# Patient Record
Sex: FEMALE | Race: BLACK OR AFRICAN AMERICAN
[De-identification: names, ages, dates, MRNs, and addresses within clinical notes are randomized per-mention and may not be internally consistent; named-entity substitution may affect disease eponyms.]

---

## 2017-02-25 ENCOUNTER — HOSPITAL ENCOUNTER (EMERGENCY)
Dept: HOSPITAL 62 - ER | Age: 57
Discharge: HOME | End: 2017-02-25
Payer: COMMERCIAL

## 2017-02-25 VITALS — DIASTOLIC BLOOD PRESSURE: 86 MMHG | SYSTOLIC BLOOD PRESSURE: 170 MMHG

## 2017-02-25 DIAGNOSIS — E11.9: ICD-10-CM

## 2017-02-25 DIAGNOSIS — I10: ICD-10-CM

## 2017-02-25 DIAGNOSIS — M25.569: ICD-10-CM

## 2017-02-25 DIAGNOSIS — M54.2: Primary | ICD-10-CM
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
## 2017-02-25 DIAGNOSIS — M54.9: ICD-10-CM

## 2017-02-25 PROCEDURE — 72050 X-RAY EXAM NECK SPINE 4/5VWS: CPT

## 2017-02-25 PROCEDURE — 99284 EMERGENCY DEPT VISIT MOD MDM: CPT
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
## 2017-05-30 ENCOUNTER — HOSPITAL ENCOUNTER (EMERGENCY)
Dept: HOSPITAL 62 - ER | Age: 57
Discharge: HOME | End: 2017-05-30
Payer: COMMERCIAL

## 2017-05-30 VITALS — SYSTOLIC BLOOD PRESSURE: 140 MMHG | DIASTOLIC BLOOD PRESSURE: 80 MMHG

## 2017-05-30 DIAGNOSIS — S16.1XXA: Primary | ICD-10-CM

## 2017-05-30 DIAGNOSIS — I10: ICD-10-CM
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
## 2017-05-30 DIAGNOSIS — Z90.710: ICD-10-CM

## 2017-05-30 DIAGNOSIS — S93.401A: ICD-10-CM

## 2017-05-30 DIAGNOSIS — S63.501A: ICD-10-CM

## 2017-05-30 DIAGNOSIS — E11.9: ICD-10-CM

## 2017-05-30 PROCEDURE — L3984 UPPER EXT FX ORTHOSIS WRIST: HCPCS

## 2017-05-30 PROCEDURE — 73610 X-RAY EXAM OF ANKLE: CPT

## 2017-05-30 PROCEDURE — 99283 EMERGENCY DEPT VISIT LOW MDM: CPT

## 2017-05-30 PROCEDURE — 73110 X-RAY EXAM OF WRIST: CPT
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
## 2017-05-30 NOTE — RADIOLOGY REPORT (SQ)
EXAM DESCRIPTION:  ANKLE RIGHT COMPLETE



COMPLETED DATE/TIME:  5/30/2017 2:40 am



REASON FOR STUDY:  injury



COMPARISON:  None.



NUMBER OF VIEWS:  Three views.



TECHNIQUE:  AP, lateral, and oblique radiographic images acquired of the right ankle.



LIMITATIONS:  None.



FINDINGS:  MINERALIZATION: Normal.

BONES: No acute fracture or dislocation.  No worrisome bone lesions.  Small plantar faucial enthesoph
yte.

JOINTS: No effusions.

SOFT TISSUES: Mild diffuse ankle swelling.

OTHER: No other significant finding.



IMPRESSION:  Mild swelling.



TECHNICAL DOCUMENTATION:  JOB ID:  [id number]

 2011 [employer]- All Rights Reserved

## 2017-07-04 ENCOUNTER — HOSPITAL ENCOUNTER (EMERGENCY)
Dept: HOSPITAL 62 - ER | Age: 57
Discharge: HOME | End: 2017-07-04
Payer: MEDICARE

## 2017-07-04 VITALS — SYSTOLIC BLOOD PRESSURE: 176 MMHG | DIASTOLIC BLOOD PRESSURE: 88 MMHG

## 2017-07-04 DIAGNOSIS — E11.9: ICD-10-CM
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
## 2017-07-04 DIAGNOSIS — N39.0: Primary | ICD-10-CM

## 2017-07-04 DIAGNOSIS — I10: ICD-10-CM

## 2017-07-04 DIAGNOSIS — G89.29: ICD-10-CM

## 2017-07-04 DIAGNOSIS — R31.9: ICD-10-CM

## 2017-07-04 DIAGNOSIS — M54.5: ICD-10-CM

## 2017-07-04 LAB
APPEARANCE UR: (no result)
BILIRUB UR QL STRIP: NEGATIVE
GLUCOSE UR STRIP-MCNC: >=500 MG/DL
KETONES UR STRIP-MCNC: (no result) MG/DL
NITRITE UR QL STRIP: NEGATIVE
PH UR STRIP: 7 [PH] (ref 5–9)
PROT UR STRIP-MCNC: 100 MG/DL
SP GR UR STRIP: 1.03
UROBILINOGEN UR-MCNC: NEGATIVE MG/DL (ref ?–2)

## 2017-07-04 PROCEDURE — 99283 EMERGENCY DEPT VISIT LOW MDM: CPT

## 2017-07-04 PROCEDURE — 87186 SC STD MICRODIL/AGAR DIL: CPT

## 2017-07-04 PROCEDURE — 81001 URINALYSIS AUTO W/SCOPE: CPT

## 2017-07-04 PROCEDURE — 87088 URINE BACTERIA CULTURE: CPT

## 2017-07-04 PROCEDURE — 87086 URINE CULTURE/COLONY COUNT: CPT

## 2017-07-04 PROCEDURE — 96372 THER/PROPH/DIAG INJ SC/IM: CPT
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
## 2017-12-26 ENCOUNTER — HOSPITAL ENCOUNTER (EMERGENCY)
Dept: HOSPITAL 62 - ER | Age: 57
Discharge: HOME | End: 2017-12-26
Payer: MEDICARE

## 2017-12-26 VITALS — DIASTOLIC BLOOD PRESSURE: 79 MMHG | SYSTOLIC BLOOD PRESSURE: 133 MMHG

## 2017-12-26 DIAGNOSIS — M79.605: ICD-10-CM

## 2017-12-26 DIAGNOSIS — E11.9: ICD-10-CM

## 2017-12-26 DIAGNOSIS — M25.552: ICD-10-CM
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
## 2017-12-26 DIAGNOSIS — S46.912A: Primary | ICD-10-CM

## 2017-12-26 DIAGNOSIS — M79.672: ICD-10-CM

## 2017-12-26 DIAGNOSIS — E86.0: ICD-10-CM

## 2017-12-26 DIAGNOSIS — I10: ICD-10-CM

## 2017-12-26 DIAGNOSIS — E78.5: ICD-10-CM

## 2017-12-26 DIAGNOSIS — M54.9: ICD-10-CM

## 2017-12-26 LAB
ALBUMIN SERPL-MCNC: 4.3 G/DL (ref 3.5–5)
ALP SERPL-CCNC: 106 U/L (ref 38–126)
ALT SERPL-CCNC: 24 U/L (ref 9–52)
ANION GAP SERPL CALC-SCNC: 11 MMOL/L (ref 5–19)
AST SERPL-CCNC: 13 U/L (ref 14–36)
BASOPHILS # BLD AUTO: 0 10^3/UL (ref 0–0.2)
BASOPHILS NFR BLD AUTO: 0.7 % (ref 0–2)
BILIRUB DIRECT SERPL-MCNC: 0.3 MG/DL (ref 0–0.4)
BILIRUB SERPL-MCNC: 0.5 MG/DL (ref 0.2–1.3)
BUN SERPL-MCNC: 23 MG/DL (ref 7–20)
CALCIUM: 10 MG/DL (ref 8.4–10.2)
CHLORIDE SERPL-SCNC: 102 MMOL/L (ref 98–107)
CK SERPL-CCNC: 50 U/L (ref 30–135)
CO2 SERPL-SCNC: 27 MMOL/L (ref 22–30)
CREAT SERPL-MCNC: 0.8 MG/DL (ref 0.52–1.25)
EOSINOPHIL # BLD AUTO: 0.1 10^3/UL (ref 0–0.6)
EOSINOPHIL NFR BLD AUTO: 1.5 % (ref 0–6)
ERYTHROCYTE [DISTWIDTH] IN BLOOD BY AUTOMATED COUNT: 13.4 % (ref 11.5–14)
GLUCOSE SERPL-MCNC: 213 MG/DL (ref 75–110)
HCT VFR BLD CALC: 37.4 % (ref 36–47)
HGB BLD-MCNC: 12.8 G/DL (ref 12–15.5)
HGB HCT DIFFERENCE: 1
LYMPHOCYTES # BLD AUTO: 2.1 10^3/UL (ref 0.5–4.7)
LYMPHOCYTES NFR BLD AUTO: 36.3 % (ref 13–45)
MCH RBC QN AUTO: 27.4 PG (ref 27–33.4)
MCHC RBC AUTO-ENTMCNC: 34.3 G/DL (ref 32–36)
MCV RBC AUTO: 80 FL (ref 80–97)
MONOCYTES # BLD AUTO: 0.2 10^3/UL (ref 0.1–1.4)
MONOCYTES NFR BLD AUTO: 3.6 % (ref 3–13)
NEUTROPHILS # BLD AUTO: 3.4 10^3/UL (ref 1.7–8.2)
NEUTS SEG NFR BLD AUTO: 57.9 % (ref 42–78)
POTASSIUM SERPL-SCNC: 4.9 MMOL/L (ref 3.6–5)
PROT SERPL-MCNC: 7.6 G/DL (ref 6.3–8.2)
RBC # BLD AUTO: 4.68 10^6/UL (ref 3.72–5.28)
SODIUM SERPL-SCNC: 139.8 MMOL/L (ref 137–145)
URATE SERPL-MCNC: 3.6 MG/DL (ref 2.5–7.5)
WBC # BLD AUTO: 5.8 10^3/UL (ref 4–10.5)

## 2017-12-26 PROCEDURE — 99283 EMERGENCY DEPT VISIT LOW MDM: CPT

## 2017-12-26 PROCEDURE — 96361 HYDRATE IV INFUSION ADD-ON: CPT

## 2017-12-26 PROCEDURE — 36415 COLL VENOUS BLD VENIPUNCTURE: CPT

## 2017-12-26 PROCEDURE — 80053 COMPREHEN METABOLIC PANEL: CPT

## 2017-12-26 PROCEDURE — 85025 COMPLETE CBC W/AUTO DIFF WBC: CPT

## 2017-12-26 PROCEDURE — 96360 HYDRATION IV INFUSION INIT: CPT

## 2017-12-26 PROCEDURE — 84550 ASSAY OF BLOOD/URIC ACID: CPT

## 2017-12-26 PROCEDURE — 82550 ASSAY OF CK (CPK): CPT

## 2017-12-26 NOTE — ER DOCUMENT REPORT
ED General





- General


Mode of Arrival: Ambulatory


Information source: Patient


TRAVEL OUTSIDE OF THE [place] IN LAST 30 DAYS: No





- HPI


Onset: Other





<KATHY COLLINS - Last Filed: 12/26/17 14:04>





<STEFFANIE WESTON - Last Filed: 12/26/17 15:57>





- General


Chief Complaint: Leg Pain


Stated Complaint: LEFT LEG PAIN


Time Seen by Provider: 12/26/17 12:56


Notes: 


Patient is a 57 year old female with a history of hypertension, diabetes, and 

hyperlipidemia presents to the emergency department complaining of left foot 

pain onset 3 weeks ago worsening 1 week ago. Patient states that the pain 

extends from her left hip and into her left leg. Patient also complained of 

left sided back pain. 


 (KATHY COLLINS)





- Related Data


Allergies/Adverse Reactions: 


 





No Known Allergies Allergy (Verified 12/26/17 12:22)


 











Past Medical History





- General


Information source: Patient





- Social History


Smoking Status: Never Smoker


Frequency of alcohol use: None


Drug Abuse: None


Family History: Reviewed & Not Pertinent


Patient has suicidal ideation: No


Patient has homicidal ideation: No





- Past Medical History


Cardiac Medical History: Reports: Hx Hypertension


Endocrine Medical History: Reports: Hx Diabetes Mellitus Type 2


Past Surgical History: Reports: Hx Appendectomy, Hx Hysterectomy, Hx 

Tonsillectomy





- Immunizations


Hx Diphtheria, Pertussis, Tetanus Vaccination: Yes





<KATHY COLLINS - Last Filed: 12/26/17 14:04>





Review of Systems





- Review of Systems


Constitutional: No symptoms reported


EENT: No symptoms reported


Cardiovascular: No symptoms reported


Respiratory: No symptoms reported


Gastrointestinal: No symptoms reported


Genitourinary: No symptoms reported


Female Genitourinary: No symptoms reported


Musculoskeletal: See HPI, Back pain


Skin: No symptoms reported


Hematologic/Lymphatic: No symptoms reported


Neurological/Psychological: No symptoms reported


-: Yes All other systems reviewed and negative





<KATHY COLLINS - Last Filed: 12/26/17 14:04>





Physical Exam





- General


General appearance: Appears well, Alert, Other - Patient appears drowsy at 

bedside.


In distress: None





- HEENT


Head: Normocephalic, Atraumatic


Eyes: Normal


Conjunctiva: Normal


Pupils: PERRL





- Respiratory


Respiratory status: No respiratory distress





- Cardiovascular


Rhythm: Regular


Heart sounds: Normal auscultation





- Abdominal


Inspection: Normal





- Back


Back: Tender - tender to the left lateral scapula





- Extremities


General upper extremity: Normal ROM


General lower extremity: Tender


Foot: Tender - tender to left metatarsal head with no swelling





- Neurological


Neuro grossly intact: Yes


Cognition: Normal


Orientation: AAOx4


[place] Coma Scale Eye Opening: Spontaneous


[place] Coma Scale Verbal: Oriented


[place] Coma Scale Motor: Obeys Commands


[place] Coma Scale Total: 15


Speech: Normal





- Psychological


Associated symptoms: Normal affect, Normal mood





- Skin


Skin Temperature: Warm


Skin Moisture: Dry


Skin Color: Normal





<KATHY COLLINS - Last Filed: 12/26/17 14:04>





<STEFFANIE WESTON - Last Filed: 12/26/17 15:57>





- Vital signs


Vitals: 


 











Temp Pulse Resp BP Pulse Ox


 


 98.1 F   84   16   220/105 H  96 


 


 12/26/17 12:29  12/26/17 12:29  12/26/17 12:29  12/26/17 12:29  12/26/17 12:29














- Extremities


Notes: 


Patient screams when palpating anywhere on left leg, particularly the fasica 

jose. (KATHY COLLINS)





Course





- Laboratory


Result Diagrams: 


 12/26/17 14:05





 12/26/17 14:05





<STEFFANIE WESTON - Last Filed: 12/26/17 15:57>





- Vital Signs


Vital signs: 


 











Temp Pulse Resp BP Pulse Ox


 


 98.6 F   77   65 H  110/68   97 


 


 12/26/17 13:14  12/26/17 13:14  12/26/17 15:31  12/26/17 15:31  12/26/17 13:14














- Laboratory


Laboratory results interpreted by me: 


 











  12/26/17





  14:05


 


BUN  23 H


 


Glucose  213 H


 


AST  13 L














Discharge





<KATHY COLLINS - Last Filed: 12/26/17 14:04>





<STEFFANIE WESTON - Last Filed: 12/26/17 15:57>





- Discharge


Clinical Impression: 


 Musculoskeletal pain of left lower extremity, Dehydration





Muscle strain of scapular region


Qualifiers:


 Encounter type: initial encounter Laterality: left Qualified Code(s): S46.912A 

- Strain of unspecified muscle, fascia and tendon at shoulder and upper arm 

level, left arm, initial encounter





Condition: Stable


Disposition: HOME, SELF-CARE


Additional Instructions: 


Muscle Strain





     You have PROBABLY strained  muscles in your left lateral scapular region 

and your left leg and foot. The seriousness of a strain varies. Some strains 

heal within days, others cause problems for months.


     X-rays cannot show a muscle strain.  X-rays are taken only if symptoms 

suggest that a fracture could be present.


     The usual treatment of a muscle strain is rest and moist heat.  The muscle 

can be used again once pain subsides.  Severe strains require a special 

exercise and stretching program to prevent permanent stiffness and disability.  

Your doctor will advise you if this will be necessary.


     Call the doctor immediately if pain or swelling becomes severe, or if 

numbness or discoloration develop.





Dehydration





     Dehydration can result from vomiting or diarrhea, fever, or decreased 

intake of fluids.  If severe, hospitalization and intravenous fluids may be 

required.  Most cases are treated at home with fluids by mouth.


     For the next 24 hours, drink lots of clear fluids.  Try to get three 

liters (3 quarts) of fluid per day.


    


////////////////////////////////////////////////////////////////////////////////

////////////////////////////////////////////////////////////////////////////////

/////////////////








The pain in your left scapular region and left lower extremity appears to be 

due to muscular strain.


Your blood pressure dropped quite a bit when you received medication for pain 

and your elevated blood pressure.


      This occurred because you are somewhat dehydrated.


You should drink plenty of fluids today and tonight.


You should rest the muscles in the leg and in your left upper back.  Limit 

walking and do any lifting you need to using the right arm.


You are prescribed Tramadol for the pain if needed.


Be sure to check your blood sugars regularly.


Follow-up with your primary care provider if you are not improving.





RETURN TO THE EMERGENCY ROOM IF ANY NEW OR WORSENING SYMPTOMS.











Prescriptions: 


Tramadol HCl 50 mg PO ASDIR PRN #20 tablet


 PRN Reason: For Pain


Referrals: 


OTIILA ART MD [Primary Care Provider] - Follow up as needed


Scribe Attestation: 





12/26/17 14:20


I personally performed the services described in the documentation, reviewed 

and edited the documentation which was dictated to the scribe in my presence, 

and it accurately records my words and actions. (STEFFANIE WESTON)





Scribe Documentation





- Scribe


Written by Scribe:: Alton Tobar, 12/26/2017 14:05


acting as scribe for :: Segun





<KATHY COLLINS - Last Filed: 12/26/17 14:04>

## 2017-12-26 NOTE — ER DOCUMENT REPORT
ED Medical Screen (RME)





- General


Chief Complaint: Leg Pain


Stated Complaint: LEFT LEG PAIN


Time Seen by Provider: 12/26/17 12:56


Mode of Arrival: Ambulatory


Information source: Patient


Notes: 





57-year-old female presents with complaints of left foot pain.  Patient denies 

any fevers or chills.  Patient's pains been going on for 2-3 weeks admits to 

high blood pressure and that she takes pills twice a day








I have greeted and performed a rapid initial assessment of this patient.  A 

comprehensive ED assessment and evaluation of the patient, analysis of test 

results and completion of the medical decision making process will be conducted 

by additional ED providers.





PHYSICAL EXAMINATION:





GENERAL: Well-appearing, well-nourished and in no acute distress.





HEAD: Atraumatic, normocephalic.





EYES: Pupils equal round extraocular movements intact,  conjunctiva are normal.





ENT: Nares patent





NECK: Normal range of motion





LUNGS: No respiratory distress





Musculoskeletal: tender left foot 








NEUROLOGICAL:  Normal speech, normal gait. 





PSYCH: Normal mood, normal affect.





SKIN: Warm, Dry, normal turgor, no rashes or lesions noted.


TRAVEL OUTSIDE OF THE [place] IN LAST 30 DAYS: No





- Related Data


Allergies/Adverse Reactions: 


 





No Known Allergies Allergy (Verified 12/26/17 12:22)


 











Past Medical History





- Social History


Frequency of alcohol use: None


Drug Abuse: None





- Past Medical History


Cardiac Medical History: Reports: Hx Hypertension


Endocrine Medical History: Reports: Hx Diabetes Mellitus Type 2


Renal/ Medical History: Denies: Hx Peritoneal Dialysis


Past Surgical History: Reports: Hx Appendectomy, Hx Hysterectomy, Hx 

Tonsillectomy





- Immunizations


Hx Diphtheria, Pertussis, Tetanus Vaccination: Yes





Physical Exam





- Vital signs


Vitals: 





 











Temp Pulse Resp BP Pulse Ox


 


 98.1 F   84   16   220/105 H  96 


 


 12/26/17 12:29  12/26/17 12:29  12/26/17 12:29  12/26/17 12:29  12/26/17 12:29














Course





- Vital Signs


Vital signs: 





 











Temp Pulse Resp BP Pulse Ox


 


 98.1 F   84   18   220/105 H  96 


 


 12/26/17 12:29  12/26/17 12:29  12/26/17 12:54  12/26/17 12:29  12/26/17 12:29

## 2018-01-07 ENCOUNTER — HOSPITAL ENCOUNTER (EMERGENCY)
Dept: HOSPITAL 62 - ER | Age: 58
Discharge: HOME | End: 2018-01-07
Payer: MEDICARE

## 2018-01-07 VITALS — SYSTOLIC BLOOD PRESSURE: 168 MMHG | DIASTOLIC BLOOD PRESSURE: 114 MMHG

## 2018-01-07 DIAGNOSIS — Z90.710: ICD-10-CM

## 2018-01-07 DIAGNOSIS — I10: ICD-10-CM

## 2018-01-07 DIAGNOSIS — M79.672: ICD-10-CM

## 2018-01-07 DIAGNOSIS — R10.11: Primary | ICD-10-CM

## 2018-01-07 DIAGNOSIS — Z88.6: ICD-10-CM

## 2018-01-07 DIAGNOSIS — E11.9: ICD-10-CM

## 2018-01-07 LAB
BASOPHILS # BLD AUTO: 0 10^3/UL (ref 0–0.2)
BASOPHILS NFR BLD AUTO: 0.8 % (ref 0–2)
EOSINOPHIL # BLD AUTO: 0.1 10^3/UL (ref 0–0.6)
EOSINOPHIL NFR BLD AUTO: 1.3 % (ref 0–6)
ERYTHROCYTE [DISTWIDTH] IN BLOOD BY AUTOMATED COUNT: 13.1 % (ref 11.5–14)
HCT VFR BLD CALC: 37.6 % (ref 36–47)
HGB BLD-MCNC: 12.9 G/DL (ref 12–15.5)
LYMPHOCYTES # BLD AUTO: 1.9 10^3/UL (ref 0.5–4.7)
LYMPHOCYTES NFR BLD AUTO: 31 % (ref 13–45)
MCH RBC QN AUTO: 27.9 PG (ref 27–33.4)
MCHC RBC AUTO-ENTMCNC: 34.4 G/DL (ref 32–36)
MCV RBC AUTO: 81 FL (ref 80–97)
MONOCYTES # BLD AUTO: 0.2 10^3/UL (ref 0.1–1.4)
MONOCYTES NFR BLD AUTO: 3.2 % (ref 3–13)
NEUTROPHILS # BLD AUTO: 3.9 10^3/UL (ref 1.7–8.2)
NEUTS SEG NFR BLD AUTO: 63.7 % (ref 42–78)
PLATELET # BLD: 203 10^3/UL (ref 150–450)
RBC # BLD AUTO: 4.64 10^6/UL (ref 3.72–5.28)
TOTAL CELLS COUNTED % (AUTO): 100 %
WBC # BLD AUTO: 6.2 10^3/UL (ref 4–10.5)

## 2018-01-07 PROCEDURE — 85025 COMPLETE CBC W/AUTO DIFF WBC: CPT

## 2018-01-07 PROCEDURE — 83690 ASSAY OF LIPASE: CPT

## 2018-01-07 PROCEDURE — 99283 EMERGENCY DEPT VISIT LOW MDM: CPT

## 2018-01-07 PROCEDURE — 80053 COMPREHEN METABOLIC PANEL: CPT

## 2018-01-07 PROCEDURE — 36415 COLL VENOUS BLD VENIPUNCTURE: CPT

## 2018-01-08 LAB
ADD MANUAL DIFF: NO
ALBUMIN SERPL-MCNC: 4.2 G/DL (ref 3.5–5)
ALP SERPL-CCNC: 96 U/L (ref 38–126)
ALT SERPL-CCNC: 27 U/L (ref 9–52)
ANION GAP SERPL CALC-SCNC: 10 MMOL/L (ref 5–19)
AST SERPL-CCNC: 13 U/L (ref 14–36)
BILIRUB DIRECT SERPL-MCNC: 0.2 MG/DL (ref 0–0.4)
BILIRUB SERPL-MCNC: 0.6 MG/DL (ref 0.2–1.3)
BUN SERPL-MCNC: 14 MG/DL (ref 7–20)
CALCIUM: 9.5 MG/DL (ref 8.4–10.2)
CHLORIDE SERPL-SCNC: 102 MMOL/L (ref 98–107)
CO2 SERPL-SCNC: 30 MMOL/L (ref 22–30)
GLUCOSE SERPL-MCNC: 196 MG/DL (ref 75–110)
LIPASE SERPL-CCNC: 339.2 U/L (ref 23–300)
POTASSIUM SERPL-SCNC: 3.9 MMOL/L (ref 3.6–5)
PROT SERPL-MCNC: 7.4 G/DL (ref 6.3–8.2)
SODIUM SERPL-SCNC: 141.7 MMOL/L (ref 137–145)

## 2018-01-08 NOTE — ER DOCUMENT REPORT
ED General





- General


Chief Complaint: Leg Pain


Stated Complaint: LEFT LEG PAIN


Time Seen by Provider: 01/07/18 09:22


Mode of Arrival: Ambulatory


Information source: Patient


Notes: 





57-year-old female presents with 2 separate complaints.  Patient notes that her 

left foot pain has been ongoing now for a few days as well as right upper 

quadrant abdominal pain over the past 4 days.  It is noted that the patient 

actually has neuropathy and has had foot pain for years but she does not admit 

to this initially, patient's abdominal pain has been on and off for a week


TRAVEL OUTSIDE OF THE [place] IN LAST 30 DAYS: No





- HPI


Onset: Other


Onset/Duration: Intermittent


Quality of pain: Burning


Severity: Mild


Pain Level: 1


Associated symptoms: Other


Exacerbated by: Denies


Relieved by: Denies


Similar symptoms previously: Yes


Recently seen / treated by doctor: Yes





- Related Data


Allergies/Adverse Reactions: 


 





ibuprofen [From Motrin] Allergy (Verified 01/07/18 08:47)


 











Past Medical History





- Social History


Smoking Status: Never Smoker


Cigarette use (# per day): No


Chew tobacco use (# tins/day): No


Smoking Education Provided: No


Family History: Reviewed & Not Pertinent





- Past Medical History


Cardiac Medical History: Reports: Hx Hypertension


Endocrine Medical History: Reports: Hx Diabetes Mellitus Type 2


Renal/ Medical History: Denies: Hx Peritoneal Dialysis


Past Surgical History: Reports: Hx Appendectomy, Hx Hysterectomy, Hx 

Tonsillectomy





- Immunizations


Hx Diphtheria, Pertussis, Tetanus Vaccination: Yes





Review of Systems





- Review of Systems


Notes: 





REVIEW OF SYSTEMS:


CONSTITUTIONAL :  Denies fever,  chills, or sweats.  Denies recent illness.


EENT:   Denies eye, ear, throat, or mouth pain or symptoms.  Denies nasal or 

sinus congestion or discharge.  Denies throat, tongue, or mouth swelling or 

difficulty swallowing.


CARDIOVASCULAR:  Denies chest pain.  Denies palpitations or racing or irregular 

heart beat.  Denies ankle edema.


RESPIRATORY:  Denies cough, cold, or chest congestion.  Denies shortness of 

breath, difficulty breathing, or wheezing.


GASTROINTESTINAL: Admits to right upper quadrant abdominal pain


GENITOURINARY:  Denies difficulty urinating, painful urination, burning, 

frequency, blood in urine, or discharge.


FEMALE  GENITOURINARY:  Denies vaginal bleeding, heavy or abnormal periods, 

irregular periods.  Denies vaginal discharge or odor. 


MUSCULOSKELETAL: Admits left foot pain


SKIN:   Denies rash, lesions or sores.


HEMATOLOGIC :   Denies easy bruising or bleeding.


LYMPHATIC:  Denies swollen, enlarged glands.


NEUROLOGICAL:  Denies confusion or altered mental status.  Denies passing out 

or loss of consciousness.  Denies dizziness or lightheadedness.  Denies 

headache.  Denies weakness or paralysis or loss of use of either side.  Denies 

problems with gait or speech.  Denies sensory loss, numbness, or tingling.  

Denies seizures.


PSYCHIATRIC:  Denies anxiety or stress.  Denies depression, suicidal ideation, 

or homicidal ideation.





ALL OTHER SYSTEMS REVIEWED AND NEGATIVE.











PHYSICAL EXAMINATION:





GENERAL: Well-appearing, well-nourished and in no acute distress.





HEAD: Atraumatic, normocephalic.





EYES: Pupils equal round and reactive to light, extraocular movements intact, 

conjunctiva are normal.





ENT: Nares patent, oropharynx clear without exudates.  Moist mucous membranes.





NECK: Normal range of motion, supple without lymphadenopathy





LUNGS: Breath sounds clear to auscultation bilaterally and equal.  No wheezes 

rales or rhonchi.





HEART: Regular rate and rhythm without murmurs





ABDOMEN: Soft, nontender, nondistended abdomen.  No guarding, no rebound.  No 

masses appreciated.  No tenderness upon palpation





Female : deferred





Musculoskeletal: Normal range of motion, no pitting or edema.  No cyanosis.





NEUROLOGICAL: Cranial nerves grossly intact.  Normal speech, normal gait.  

Normal sensory, motor exams





PSYCH: Normal mood, normal affect.





SKIN: Warm, Dry, normal turgor, no rashes or lesions noted.  Patient states 

there is pain upon palpation of the foot

















Dictation was performed using Dragon voice recognition software





Physical Exam





- Vital signs


Vitals: 





 











Temp Pulse Resp BP Pulse Ox


 


 98.5 F   80   16   168/114 H  93 


 


 01/07/18 08:53  01/07/18 08:53  01/07/18 08:53  01/07/18 08:53  01/07/18 08:53














Course





- Re-evaluation


Re-evalutation: 





01/08/18 08:29


Pulses are intact patient is able to move her digits sensation motor functions 

appropriate but patient states that is extremely painful upon touch.  I 

extremely low suspicion for any life-threatening issues, on her lab work 

everything was benign this was consistent with previous lab work from a few 

days ago.  Patient consistently asked for pain medication was given such.  It 

appears she has been treated extensively for this neuropathic pain which 

initially denied








I will have the patient follow-up with her primary care physician, her pain 

specialist, and her neurologist for further evaluation care








After performing a Medical Screening Examination, I estimate there is LOW risk 

for ACUTE APPENDICITIS, BOWEL OBSTRUCTION, ACUTE CHOLECYSTITIS, PERFORATED 

DIVERTICULITIS, INCARCERATED HERNIA, PANCREATITIS, PELVIC INFLAMMATORY DISEASE, 

PERFORATED ULCER, ECTOPIC PREGNANCY, or TUBO-OVARIAN ABSCESS, thus I consider 

the discharge disposition reasonable. Also, there is no evidence or peritonitis

, sepsis, or toxicity. I have reevaluated this patient multiple times and no 

significant life threatening changes are noted. The patient and I have 

discussed the diagnosis and risks, and we agree with discharging home with 

close follow-up with the understanding that symptoms and presentations can 

change. We also discussed returning to the Emergency Department immediately if 

new or worsening symptoms occur. We have discussed the symptoms which are most 

concerning (e.g., bloody stool, fever, changing or worsening pain, vomiting) 

that necessitate immediate return.





- Vital Signs


Vital signs: 





 











Temp Pulse Resp BP Pulse Ox


 


 98.5 F   80   16   168/114 H  93 


 


 01/07/18 08:53  01/07/18 08:53  01/07/18 08:53  01/07/18 08:53  01/07/18 08:53














- Laboratory


Result Diagrams: 


 01/07/18 10:43





 01/07/18 10:43





Discharge





- Discharge


Clinical Impression: 


 Abdominal pain, right upper quadrant, Left foot pain





Condition: Stable


Disposition: HOME, SELF-CARE


Instructions:  Abdominal Pain ([facility])


Referrals: 


OTILIA ART MD [Primary Care Provider] - Follow up tomorrow

## 2018-01-24 ENCOUNTER — HOSPITAL ENCOUNTER (OUTPATIENT)
Dept: HOSPITAL 62 - WI | Age: 58
End: 2018-01-24
Attending: INTERNAL MEDICINE
Payer: MEDICARE

## 2018-01-24 DIAGNOSIS — Z12.31: Primary | ICD-10-CM

## 2018-01-24 PROCEDURE — 77067 SCR MAMMO BI INCL CAD: CPT

## 2018-01-24 NOTE — WOMENS IMAGING REPORT
EXAM DESCRIPTION:  BILAT SCREENING MAMMO W/CAD



COMPLETED DATE/TIME:  1/24/2018 3:33 pm



REASON FOR STUDY:  ROUTINE SCREENING; Z12.31 Z12.31  ENCNTR SCREEN MAMMOGRAM FOR MALIGNANT NEOPLASM O
F SRINIVAS



COMPARISON:  7/10/2013 and 11/13/2010.



TECHNIQUE:  Standard craniocaudal and mediolateral oblique views of each breast recorded using digita
l acquisition.



LIMITATIONS:  None.



FINDINGS:  No masses, calcifications or architectural distortion. No areas of suspicion.

Read with the assistance of CAD.

.[facility] - R2 Cenova Version 1.3

.[facility] - R2 Cenova Version 1.3

.[facility] - R2 Cenova Version 2.4

.[facility] - R2 Cenova Version 2.4

.[facility] - R2  Version 9.2



IMPRESSION:  NORMAL MAMMOGRAM.  BIRADS 1.



BREAST DENSITY:  b. There are scattered areas of fibroglandular density.



BIRAD:  1 NEGATIVE



RECOMMENDATION:  ROUTINE SCREENING



COMMENT:  The patient has been notified of the results by letter per MQSA requirements. Additional no
tification policies are in place for contacting patient with suspicious or incomplete findings.

Quality ID #225: The American College of Radiology recommends an annual screening mammogram for women
 aged 40 years or over. This facility utilizes a reminder system to ensure that all patients receive 
reminder letters, and/or direct phone calls for appointments. This includes reminders for routine scr
eening mammograms, diagnostic mammograms, or other Breast Imaging Interventions when appropriate.  Th
is patient will be placed in the appropriate reminder system.

The American College of Radiology (ACR) has developed recommendations for screening MRI of the breast
s in certain patient populations, to be used in conjunction with mammography.  Breast MRI surveillanc
e may be appropriate for women with more than 20% lifetime risk of developing breast cancer  as deter
mined by genetic testing, significant family history of the disease, or history of mantle radiation f
or Hodgkins Disease.  ACR Practice Guidelines 2008.



TECHNICAL DOCUMENTATION:  FINDING NUMBER: (1)

ASSESSMENT: (1)

JOB ID:  [id number]

 2011 [employer]- All Rights Reserved

## 2020-08-31 ENCOUNTER — HOSPITAL ENCOUNTER (EMERGENCY)
Dept: HOSPITAL 62 - ER | Age: 60
Discharge: HOME | End: 2020-08-31
Payer: MEDICARE

## 2020-08-31 VITALS — SYSTOLIC BLOOD PRESSURE: 191 MMHG | DIASTOLIC BLOOD PRESSURE: 99 MMHG

## 2020-08-31 DIAGNOSIS — Z88.8: ICD-10-CM

## 2020-08-31 DIAGNOSIS — M54.2: ICD-10-CM

## 2020-08-31 DIAGNOSIS — B37.3: Primary | ICD-10-CM

## 2020-08-31 DIAGNOSIS — H92.02: ICD-10-CM

## 2020-08-31 DIAGNOSIS — I10: ICD-10-CM

## 2020-08-31 DIAGNOSIS — E11.65: ICD-10-CM

## 2020-08-31 DIAGNOSIS — G89.29: ICD-10-CM

## 2020-08-31 DIAGNOSIS — H60.502: ICD-10-CM

## 2020-08-31 LAB
ADD MANUAL DIFF: NO
ALBUMIN SERPL-MCNC: 4 G/DL (ref 3.5–5)
ALP SERPL-CCNC: 162 U/L (ref 38–126)
ANION GAP SERPL CALC-SCNC: 9 MMOL/L (ref 5–19)
APPEARANCE UR: (no result)
APTT PPP: YELLOW S
AST SERPL-CCNC: 20 U/L (ref 14–36)
BASE EXCESS BLDV CALC-SCNC: 0.2 MMOL/L
BASOPHILS # BLD AUTO: 0 10^3/UL (ref 0–0.2)
BASOPHILS NFR BLD AUTO: 0.5 % (ref 0–2)
BILIRUB DIRECT SERPL-MCNC: 0.2 MG/DL (ref 0–0.4)
BILIRUB SERPL-MCNC: 0.8 MG/DL (ref 0.2–1.3)
BILIRUB UR QL STRIP: NEGATIVE
BUN SERPL-MCNC: 14 MG/DL (ref 7–20)
CALCIUM: 9 MG/DL (ref 8.4–10.2)
CHLORIDE SERPL-SCNC: 97 MMOL/L (ref 98–107)
CO2 SERPL-SCNC: 27 MMOL/L (ref 22–30)
EOSINOPHIL # BLD AUTO: 0.1 10^3/UL (ref 0–0.6)
EOSINOPHIL NFR BLD AUTO: 1.4 % (ref 0–6)
ERYTHROCYTE [DISTWIDTH] IN BLOOD BY AUTOMATED COUNT: 13.2 % (ref 11.5–14)
GLUCOSE SERPL-MCNC: 444 MG/DL (ref 75–110)
GLUCOSE UR STRIP-MCNC: >=500 MG/DL
HCO3 BLDV-SCNC: 27.1 MMOL/L (ref 20–32)
HCT VFR BLD CALC: 39.3 % (ref 36–47)
HGB BLD-MCNC: 13.3 G/DL (ref 12–15.5)
KETONES UR STRIP-MCNC: NEGATIVE MG/DL
LYMPHOCYTES # BLD AUTO: 2.1 10^3/UL (ref 0.5–4.7)
LYMPHOCYTES NFR BLD AUTO: 30 % (ref 13–45)
MCH RBC QN AUTO: 28.3 PG (ref 27–33.4)
MCHC RBC AUTO-ENTMCNC: 33.9 G/DL (ref 32–36)
MCV RBC AUTO: 84 FL (ref 80–97)
MONOCYTES # BLD AUTO: 0.3 10^3/UL (ref 0.1–1.4)
MONOCYTES NFR BLD AUTO: 4.3 % (ref 3–13)
NEUTROPHILS # BLD AUTO: 4.4 10^3/UL (ref 1.7–8.2)
NEUTS SEG NFR BLD AUTO: 63.8 % (ref 42–78)
NITRITE UR QL STRIP: NEGATIVE
PCO2 BLDV: 53 MMHG (ref 35–63)
PH BLDV: 7.33 [PH] (ref 7.3–7.42)
PH UR STRIP: 6 [PH] (ref 5–9)
PLATELET # BLD: 189 10^3/UL (ref 150–450)
POTASSIUM SERPL-SCNC: 4.6 MMOL/L (ref 3.6–5)
PROT SERPL-MCNC: 7.4 G/DL (ref 6.3–8.2)
PROT UR STRIP-MCNC: NEGATIVE MG/DL
RBC # BLD AUTO: 4.71 10^6/UL (ref 3.72–5.28)
SP GR UR STRIP: 1.04
TOTAL CELLS COUNTED % (AUTO): 100 %
UROBILINOGEN UR-MCNC: NEGATIVE MG/DL (ref ?–2)
WBC # BLD AUTO: 6.9 10^3/UL (ref 4–10.5)

## 2020-08-31 PROCEDURE — 82803 BLOOD GASES ANY COMBINATION: CPT

## 2020-08-31 PROCEDURE — 80053 COMPREHEN METABOLIC PANEL: CPT

## 2020-08-31 PROCEDURE — 85025 COMPLETE CBC W/AUTO DIFF WBC: CPT

## 2020-08-31 PROCEDURE — 36415 COLL VENOUS BLD VENIPUNCTURE: CPT

## 2020-08-31 PROCEDURE — 99284 EMERGENCY DEPT VISIT MOD MDM: CPT

## 2020-08-31 PROCEDURE — 81001 URINALYSIS AUTO W/SCOPE: CPT

## 2020-08-31 NOTE — ER DOCUMENT REPORT
ED General





- General


Chief Complaint: Ear Pain


Stated Complaint: EAR PAIN,NECK PAIN


Time Seen by Provider: 08/31/20 15:30


Primary Care Provider: 


OTILIA ART MD [Primary Care Provider] - Follow up as needed


TRAVEL OUTSIDE OF THE [place] IN LAST 30 DAYS: No





- HPI


Notes: 





Patient is a 60-year-old female who presents to the emergency department for 

evaluation of multiple complaints.  She complains of left ear pain.  She states 

is been going on for little over a week.  She denies any difficulty hearing, no 

tinnitus.  She states she might of had a fever, but really has not checked her 

temperature.  No chills.  No cough, no sore throat, although she states the pain

refers into her throat on occasion.  She also has some neck pain.  She states 

this is chronic.  No new injuries.  No numbness or tingling in her arms.  She 

used to be on Ultram for this, but cannot afford to go see her doctor.  The 

patient also states her blood sugars have been high.  She is usually on metfor

min twice daily, but has been off of this for several months.  She also states 

she is been having itching in her vaginal area.  She states she uses an 

over-the-counter yeast medication, it seems to improve, then it comes back.  She

really has not tried anything to help her with her pain in her ear or her neck, 

she states she is allergic to ibuprofen.





- Related Data


Allergies/Adverse Reactions: 


                                        





ibuprofen [From Motrin] Allergy (Verified 08/31/20 19:01)


   











Past Medical History





- General


Information source: Patient





- Social History


Smoking Status: Never Smoker


Frequency of alcohol use: None


Drug Abuse: None


Family History: Reviewed & Not Pertinent





- Past Medical History


Cardiac Medical History: Reports: Hx Hypercholesterolemia, Hx Hypertension


Endocrine Medical History: Reports: Hx Diabetes Mellitus Type 2


Renal/ Medical History: Denies: Hx Peritoneal Dialysis


Past Surgical History: Reports: Hx Appendectomy, Hx Hysterectomy, Hx 

Tonsillectomy





- Immunizations


Hx Diphtheria, Pertussis, Tetanus Vaccination: Yes





Review of Systems





- Review of Systems


Constitutional: See HPI


EENT: See HPI


Cardiovascular: No symptoms reported


Respiratory: No symptoms reported


Gastrointestinal: No symptoms reported


Genitourinary: No symptoms reported


Female Genitourinary: See HPI


Musculoskeletal: See HPI


Skin: No symptoms reported


Neurological/Psychological: No symptoms reported





Physical Exam





- Vital signs


Vitals: 





                                        











Temp Pulse Resp BP Pulse Ox


 


 99.6 F   88   18   144/76 H  96 


 


 08/31/20 15:13  08/31/20 15:13  08/31/20 15:13  08/31/20 15:13  08/31/20 15:13














- Notes


Notes: 





Vital signs reviewed, please refer to chart. Head is normocephalic, atraumatic. 

Pupils equal round, reactive to light.  Right external auditory canal is normal.

 Bilateral TMs are pearly gray with good light reflex.  Left external auditory 

canal is edematous with a small amount of adherent purulence noted at the 10 

o'clock position.  Positive tragus tenderness.  Oral mucosa is moist.  No 

pharyngeal erythema or exudate.  Neck is supple without meningismus.  

Examination of the spine yields no midline tenderness or step-off.  No 

paraspinal musculature tenderness is appreciated.  Heart is regular rate and 

rhythm.  Lungs are clear to auscultation bilaterally.  Abdomen is soft, 

nontender, normoactive bowel sounds throughout.  Extremities without cyanosis, 

clubbing. Posterior calves are nontender.  Peripheral pulses are equal.  Skin is

warm and dry.  Patient is awake, alert, neurological exam is nonfocal.





Course





- Re-evaluation


Re-evalutation: 





08/31/20 20:27


Patient presents to the emergency department for evaluation.  She had laboratory

investigations as ordered through triage.  She is markedly hyperglycemic, but 

not acidotic.  This is not surprising given the fact that she has been off her 

medication for some time.  I suspect that she has Candida as a result of the 

glycosuria from her hyperglycemia.  Patient was educated on this.  She was 

educated on wise diabetic diet decisions.  I explained to her the importance of 

following up with her primary care provider, she voiced understanding.  She was 

given subcutaneous insulin here.  She is also treated with Cortisporin otic for 

her left otitis externa.  She was given Diflucan for Candida.  She was given Ul

tram for her neck pain.  I am not going to prescribe her any Ultram, I think she

should follow-up with her primary care provider for chronic pain medications.  

Otherwise I will write her a prescription for metformin and lisinopril.  The 

patient is unsure of the her lisinopril dose, believes it was 25 mg.  I will 

start her on lisinopril 20 mg daily.  She will be given a two-week supply and 

instructions that she needs to follow-up with her primary care provider.  She 

voiced understanding will be discharged.





- Vital Signs


Vital signs: 





                                        











Temp Pulse Resp BP Pulse Ox


 


 99.6 F   88   18   144/76 H  96 


 


 08/31/20 15:13  08/31/20 15:13  08/31/20 15:13  08/31/20 15:13  08/31/20 15:13














- Laboratory


Result Diagrams: 


                                 08/31/20 16:06





                                 08/31/20 16:06


Laboratory results interpreted by me: 





                                        











  08/31/20 08/31/20





  16:06 16:06


 


Sodium  132.7 L 


 


Chloride  97 L 


 


Glucose  444 H* 


 


Alkaline Phosphatase  162 H 


 


Urine Glucose (UA)   >=500 H


 


Ur Leukocyte Esterase   TRACE H














Discharge





- Discharge


Clinical Impression: 


 Chronic neck pain, Vaginal candidiasis





Left otitis externa


Qualifiers:


 Otitis externa type: unspecified type Chronicity: acute Qualified Code(s): 

H60.502 - Unspecified acute noninfective otitis externa, left ear





Hyperglycemia due to type 2 diabetes mellitus


Qualifiers:


 Diabetes mellitus long term insulin use: without long term use Qualified 

Code(s): E11.65 - Type 2 diabetes mellitus with hyperglycemia





Condition: Stable


Disposition: HOME, SELF-CARE


Instructions:  Use of Ear Drops ([facility]), Otitis Externa ([facility]), Diabetes ([facility]), 

Chronic Pain Control ([facility]), High Blood Pressure ([facility])


Additional Instructions: 


It is very important that you take your medications as prescribed.  Use the 

Cortisporin otic, 4 drops in your left ear 3 times daily for 10 days.  Try to 

avoid simple sugars in your diet as discussed.  It is very important that you 

follow-up with your primary care provider for control of your chronic medical 

conditions.  If you develop worsening or new concerning symptoms of any sort, 

please return immediately to the emergency department for reevaluation.


Referrals: 


OTILIA ART MD [Primary Care Provider] - Follow up as needed

## 2020-08-31 NOTE — ER DOCUMENT REPORT
ED Medical Screen (RME)





- General


Chief Complaint: Ear Pain


Stated Complaint: EAR PAIN,NECK PAIN


Time Seen by Provider: 08/31/20 15:30


Primary Care Provider: 


OTILIA ART MD [Primary Care Provider] - Follow up as needed


Information source: Patient


Notes: 





Patient presents with multiple complaints.  Patient complains of bilateral ear 

pain with swelling to the left side of face.  Patient also complains of vaginal 

pain and urinary frequency.  Patient denies any fever or dysuria.  Patient 

denies any nausea or vomiting.  Patient reports a history of diabetes and states

she has been off of medication for several months.  Patient complains of lower 

back pain.





I have greeted and performed a rapid initial assessment of this patient.  A 

comprehensive ED assessment and evaluation of the patient, analysis of test 

results and completion of the medical decision making process will be conducted 

by additional ED providers.








TRAVEL OUTSIDE OF THE [place] IN LAST 30 DAYS: No





- Related Data


Allergies/Adverse Reactions: 


                                        





ibuprofen [From Motrin] Allergy (Verified 01/07/18 08:47)


   











Past Medical History





- Social History


Frequency of alcohol use: None


Drug Abuse: None





- Past Medical History


Cardiac Medical History: Reports: Hx Hypertension


Endocrine Medical History: Reports: Hx Diabetes Mellitus Type 2


Renal/ Medical History: Denies: Hx Peritoneal Dialysis


Past Surgical History: Reports: Hx Appendectomy, Hx Hysterectomy, Hx 

Tonsillectomy





- Immunizations


Hx Diphtheria, Pertussis, Tetanus Vaccination: Yes





Physical Exam





- Vital signs


Vitals: 





                                        











Temp Pulse Resp BP Pulse Ox


 


 99.6 F   88   18   144/76 H  96 


 


 08/31/20 15:13  08/31/20 15:13  08/31/20 15:13  08/31/20 15:13  08/31/20 15:13














- General


Notes: 





Swelling to left external auditory canal, pain with movement of left helix





Course





- Vital Signs


Vital signs: 





                                        











Temp Pulse Resp BP Pulse Ox


 


 99.6 F   88   18   144/76 H  96 


 


 08/31/20 15:13  08/31/20 15:13  08/31/20 15:13  08/31/20 15:13  08/31/20 15:13














Doctor's Discharge





- Discharge


Referrals: 


OTILIA ART MD [Primary Care Provider] - Follow up as needed